# Patient Record
Sex: MALE | Race: WHITE | Employment: UNEMPLOYED | URBAN - METROPOLITAN AREA
[De-identification: names, ages, dates, MRNs, and addresses within clinical notes are randomized per-mention and may not be internally consistent; named-entity substitution may affect disease eponyms.]

---
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
## 2025-05-27 ENCOUNTER — HOSPITAL ENCOUNTER (OUTPATIENT)
Age: 20
Setting detail: SPECIMEN
Discharge: HOME | End: 2025-05-27

## 2025-05-27 ENCOUNTER — TELEPHONE (OUTPATIENT)
Dept: GASTROENTEROLOGY | Age: 20
End: 2025-05-27

## 2025-05-27 DIAGNOSIS — K62.89 RECTAL PAIN: ICD-10-CM

## 2025-05-27 NOTE — TELEPHONE ENCOUNTER
Claribel at [facility] stated she was speaking with someone in the office regarding getting patient an emergency visit and got disconnected. She can be reached at [phone]. Okay to leave a message. Ask for nurse Coral.

## 2025-05-27 NOTE — TELEPHONE ENCOUNTER
Writer attempted to reach Coral, no answer and no voicemail; patient can be scheduled with next soonest available appointment with any provider except christina

## 2025-05-28 LAB
CHLAMYDIA DNA UR QL NAA+PROBE: NEGATIVE
N GONORRHOEA DNA UR QL NAA+PROBE: NEGATIVE
SPECIMEN DESCRIPTION: NORMAL

## 2025-06-17 ENCOUNTER — HOSPITAL ENCOUNTER (OUTPATIENT)
Dept: GENERAL RADIOLOGY | Facility: CLINIC | Age: 20
Discharge: HOME | End: 2025-06-19
Payer: COMMERCIAL

## 2025-06-17 DIAGNOSIS — K59.00 CONSTIPATION, UNSPECIFIED CONSTIPATION TYPE: ICD-10-CM

## 2025-06-17 PROCEDURE — 74018 RADEX ABDOMEN 1 VIEW: CPT

## 2025-06-24 ENCOUNTER — HOSPITAL ENCOUNTER (OUTPATIENT)
Dept: CT IMAGING | Age: 20
Discharge: HOME | End: 2025-06-26
Payer: COMMERCIAL

## 2025-06-24 DIAGNOSIS — R19.5 CHANGE IN STOOL: ICD-10-CM

## 2025-06-24 DIAGNOSIS — K62.89 RECTAL PAIN: ICD-10-CM

## 2025-06-24 PROCEDURE — 2580000003 HC RX 258

## 2025-06-24 PROCEDURE — 74177 CT ABD & PELVIS W/CONTRAST: CPT

## 2025-06-24 PROCEDURE — 6360000004 HC RX CONTRAST MEDICATION

## 2025-06-24 PROCEDURE — 2500000003 HC RX 250 WO HCPCS

## 2025-06-24 RX ORDER — IOPAMIDOL 755 MG/ML
75 INJECTION, SOLUTION INTRAVASCULAR
Status: COMPLETED | OUTPATIENT
Start: 2025-06-24 | End: 2025-06-24

## 2025-06-24 RX ORDER — 0.9 % SODIUM CHLORIDE 0.9 %
80 INTRAVENOUS SOLUTION INTRAVENOUS ONCE
Status: COMPLETED | OUTPATIENT
Start: 2025-06-24 | End: 2025-06-24

## 2025-06-24 RX ORDER — SODIUM CHLORIDE 0.9 % (FLUSH) 0.9 %
10 SYRINGE (ML) INJECTION ONCE
Status: COMPLETED | OUTPATIENT
Start: 2025-06-24 | End: 2025-06-24

## 2025-06-24 RX ADMIN — IOPAMIDOL 75 ML: 755 INJECTION, SOLUTION INTRAVENOUS at 16:14

## 2025-06-24 RX ADMIN — SODIUM CHLORIDE, PRESERVATIVE FREE 10 ML: 5 INJECTION INTRAVENOUS at 16:14

## 2025-06-24 RX ADMIN — SODIUM CHLORIDE 80 ML: 9 INJECTION, SOLUTION INTRAVENOUS at 16:14

## 2025-06-24 RX ADMIN — BARIUM SULFATE 450 ML: 20 SUSPENSION ORAL at 16:14

## 2025-07-23 ENCOUNTER — OFFICE VISIT (OUTPATIENT)
Dept: GASTROENTEROLOGY | Age: 20
End: 2025-07-23
Payer: COMMERCIAL

## 2025-07-23 VITALS
RESPIRATION RATE: 18 BRPM | DIASTOLIC BLOOD PRESSURE: 77 MMHG | BODY MASS INDEX: 20.47 KG/M2 | SYSTOLIC BLOOD PRESSURE: 138 MMHG | TEMPERATURE: 96.9 F | OXYGEN SATURATION: 96 % | WEIGHT: 123 LBS | HEART RATE: 66 BPM

## 2025-07-23 DIAGNOSIS — F84.0 AUTISM SPECTRUM DISORDER: ICD-10-CM

## 2025-07-23 DIAGNOSIS — R10.30 LOWER ABDOMINAL PAIN: ICD-10-CM

## 2025-07-23 DIAGNOSIS — K62.89 RECTAL PAIN: Primary | ICD-10-CM

## 2025-07-23 DIAGNOSIS — F90.2 ATTENTION DEFICIT HYPERACTIVITY DISORDER (ADHD), COMBINED TYPE: ICD-10-CM

## 2025-07-23 DIAGNOSIS — F45.8: ICD-10-CM

## 2025-07-23 PROCEDURE — 99204 OFFICE O/P NEW MOD 45 MIN: CPT | Performed by: PHYSICIAN ASSISTANT

## 2025-07-23 RX ORDER — POLYETHYLENE GLYCOL 3350, SODIUM SULFATE ANHYDROUS, SODIUM BICARBONATE, SODIUM CHLORIDE, POTASSIUM CHLORIDE 236; 22.74; 6.74; 5.86; 2.97 G/4L; G/4L; G/4L; G/4L; G/4L
4 POWDER, FOR SOLUTION ORAL ONCE
Qty: 1 ML | Refills: 0 | Status: SHIPPED | OUTPATIENT
Start: 2025-07-23 | End: 2025-07-23

## 2025-07-23 RX ORDER — BISACODYL 5 MG
TABLET, DELAYED RELEASE (ENTERIC COATED) ORAL
Qty: 4 TABLET | Refills: 0 | Status: SHIPPED | OUTPATIENT
Start: 2025-07-23

## 2025-07-23 ASSESSMENT — ENCOUNTER SYMPTOMS
VOMITING: 0
ANAL BLEEDING: 0
TROUBLE SWALLOWING: 0
COUGH: 0
ABDOMINAL DISTENTION: 0
SORE THROAT: 0
ABDOMINAL PAIN: 1
WHEEZING: 0
VOICE CHANGE: 0
DIARRHEA: 0
BLOOD IN STOOL: 0
NAUSEA: 0
RECTAL PAIN: 1
CHOKING: 0
CONSTIPATION: 0

## 2025-07-23 NOTE — PROGRESS NOTES
Reason for Referral:   Claribel Young, APRN - CNP  [address] [place] [address]    Chief Complaint   Patient presents with    New Patient     Referred for rectal pain. Patient states that the pain has been present for months.        HISTORY OF PRESENT ILLNESS: Mr.Andrew ANGELITO Mello is a 20 y.o. male with ASD and ADHD, referred for evaluation of rectal pain/constipation, abdominal pain.    This is a new patient  Referred by PCP at [facility]  He also reports worsening issues with rectal pressure and abdominal pain for the past few months which is progressively worsening. Reports he thinks it is from \"holding it in,\" and avoiding BMs. Sometimes forgets to have BMs. Then requires laxatives and straining (on daily miralax / prn senna). Very skinny stools 4-5 times a day without sense of complete emptying. No blood in the stools.     CT abd pelvis completed which showed no significant abdominopelvic findings.    Very rare heartburn. Denies fever/chills, unintentional weight loss, dysphagia/odynophagia, n/v, abd pain, diarrhea, black or bloody stools.     No known FHx GI cancers  No regular NSAID use  No EtOH, tobacco products (using nicotine vapes)      Past Medical,Family, and Social History reviewed and does contribute to the patient presentingcondition.    I did review all the labs results available for the labs which were ordered by the primary care physician, and the other consultants, we search on [facility] at [facility] and all the available care everywhere epic    I did review all the imaging studies of the abdomen available on EMR, ordered by the primary care physician and the other consultant    I did review all the pathology from the biopsies done on the previous endoscopies        Patient's PMH/PSH,SH,PSYCH Hx, MEDs, ALLERGIES, and ROS were all reviewed and updated in the appropriate sections.    PAST MEDICAL HISTORY:  Past Medical History:   Diagnosis Date    ADHD (attention deficit hyperactivity

## 2025-07-23 NOTE — TELEPHONE ENCOUNTER
Procedure scheduled/Dr De La Fuente  Procedure: colon  Dx: rectal pain-lower abdominal pain  Date: 12/19/25  Time: 3:00 pm procedure- 1:30 pm arrival time  Hospital: [facility] phone call:TBD  Bowel Prep instructions given: mika  In office/via phone: office  Clearance needed:none  GLP-1: nobe